# Patient Record
Sex: MALE | Race: WHITE
[De-identification: names, ages, dates, MRNs, and addresses within clinical notes are randomized per-mention and may not be internally consistent; named-entity substitution may affect disease eponyms.]

---

## 2020-02-07 ENCOUNTER — HOSPITAL ENCOUNTER (EMERGENCY)
Dept: HOSPITAL 35 - ER | Age: 60
Discharge: ANOTHER HOSPITAL | End: 2020-02-07
Payer: COMMERCIAL

## 2020-02-07 VITALS — WEIGHT: 250 LBS | BODY MASS INDEX: 35 KG/M2 | HEIGHT: 71 IN

## 2020-02-07 VITALS — SYSTOLIC BLOOD PRESSURE: 129 MMHG | DIASTOLIC BLOOD PRESSURE: 57 MMHG

## 2020-02-07 DIAGNOSIS — J18.9: ICD-10-CM

## 2020-02-07 DIAGNOSIS — E10.22: ICD-10-CM

## 2020-02-07 DIAGNOSIS — J96.91: Primary | ICD-10-CM

## 2020-02-07 DIAGNOSIS — J11.1: ICD-10-CM

## 2020-02-07 DIAGNOSIS — I12.9: ICD-10-CM

## 2020-02-07 DIAGNOSIS — Z90.89: ICD-10-CM

## 2020-02-07 DIAGNOSIS — Z85.6: ICD-10-CM

## 2020-02-07 DIAGNOSIS — R79.89: ICD-10-CM

## 2020-02-07 DIAGNOSIS — N18.9: ICD-10-CM

## 2020-02-07 DIAGNOSIS — Z88.6: ICD-10-CM

## 2020-02-07 DIAGNOSIS — Z94.81: ICD-10-CM

## 2020-02-07 DIAGNOSIS — F41.9: ICD-10-CM

## 2020-02-07 DIAGNOSIS — Z87.891: ICD-10-CM

## 2020-02-07 LAB
ALBUMIN SERPL-MCNC: 3.9 G/DL (ref 3.4–5)
ALT SERPL-CCNC: 55 U/L (ref 30–65)
ANION GAP SERPL CALC-SCNC: 11 MMOL/L (ref 7–16)
APTT BLD: 28.7 SECONDS (ref 24.5–32.8)
AST SERPL-CCNC: 63 U/L (ref 15–37)
BASOPHILS NFR BLD AUTO: 0.1 % (ref 0–2)
BE(VIVO): -3.2 MMOL/L
BILIRUB SERPL-MCNC: 0.6 MG/DL
BUN SERPL-MCNC: 27 MG/DL (ref 7–18)
CALCIUM SERPL-MCNC: 9 MG/DL (ref 8.5–10.1)
CHLORIDE SERPL-SCNC: 99 MMOL/L (ref 98–107)
CO2 SERPL-SCNC: 24 MMOL/L (ref 21–32)
CREAT SERPL-MCNC: 1.6 MG/DL (ref 0.7–1.3)
EOSINOPHIL NFR BLD: 0 % (ref 0–3)
ERYTHROCYTE [DISTWIDTH] IN BLOOD BY AUTOMATED COUNT: 14.1 % (ref 10.5–14.5)
GLUCOSE SERPL-MCNC: 209 MG/DL (ref 74–106)
GRANULOCYTES NFR BLD MANUAL: 90.8 % (ref 36–66)
HCO3 BLD-SCNC: 20.4 MMOL/L (ref 22–26)
HCT VFR BLD CALC: 43.7 % (ref 42–52)
HGB BLD-MCNC: 14.7 GM/DL (ref 14–18)
INR PPP: 1.1
LYMPHOCYTES NFR BLD AUTO: 3.1 % (ref 24–44)
MAGNESIUM SERPL-MCNC: 1.8 MG/DL (ref 1.8–2.4)
MCH RBC QN AUTO: 31.4 PG (ref 26–34)
MCHC RBC AUTO-ENTMCNC: 33.5 G/DL (ref 28–37)
MCV RBC: 93.7 FL (ref 80–100)
MONOCYTES NFR BLD: 6 % (ref 1–8)
NEUTROPHILS # BLD: 10.4 THOU/UL (ref 1.4–8.2)
PCO2 BLD: 32.5 MMHG (ref 35–45)
PLATELET # BLD: 203 THOU/UL (ref 150–400)
PO2 BLD: 59.3 MMHG (ref 80–100)
POTASSIUM SERPL-SCNC: 4.7 MMOL/L (ref 3.5–5.1)
PROT SERPL-MCNC: 7.9 G/DL (ref 6.4–8.2)
PROTHROMBIN TIME: 10.8 SECONDS (ref 9.3–11.4)
RBC # BLD AUTO: 4.67 MIL/UL (ref 4.5–6)
SODIUM SERPL-SCNC: 134 MMOL/L (ref 136–145)
TROPONIN I SERPL-MCNC: 4.88 NG/ML (ref ?–0.06)
WBC # BLD AUTO: 11.4 THOU/UL (ref 4–11)

## 2020-02-07 NOTE — HC
[facility]
[address]
[place]   [address]                     CONSULTATION                  
_______________________________________________________________________________
 
Name:       BRONWYN PIKE                    [facility] #:                     [facility]      [id number]                       Account #:      [id number]  
Admission:  02/07/20    Attend Phys:                          
Discharge:  02/07/20    Date of Birth:  [birth year]  
                                                          Report #: [id number]
                                                                    [id number]   
_______________________________________________________________________________
THIS REPORT FOR:  
 
cc:  Bret Vivar MD, Rene P. MD                                                   
                                                                  ~
CC: Ananth Vivar
 
DATE OF SERVICE:  02/07/2020
 
 
CARDIOLOGY CONSULT
 
HISTORY OF PRESENT ILLNESS:  The patient is a 59-year-old male, who comes in
with a 2-day history of some progressive dyspnea and shortness of breath.  He
was normally seen over in Dr. Vivar and Dr. Kristen Gamboa's office, redirected to
the Emergency Room.  He had been working last night and came home in the early
morning hours.  It was the night before, having progressive body aches,
weakness, some intermittent chest pain, back pain, dizziness, lightheadedness,
also some nausea with some dry heaves.  EKG has some nonspecific changes and the
troponin is 5.  He is significantly hypoxemic and has tested positive for
influenza A.  He is a marginal historian, but has had 3 stents placed, the last
of which was maybe 10 years ago.  He is followed by Lindsay [facility] by
Cardiology there.  He does not remember his last stress test.  He says he may
have a little old damage done.
 
MEDICATIONS:  His current medications are Cymbalta, Norvasc 10, atorvastatin 80,
baby aspirin, Flexeril, Desyrel, Lantus, NovoLog, ibuprofen, Xanax, and losartan
50.
 
PAST MEDICAL HISTORY:  Positive for coronary artery disease with 3 stents
placed, hypertension, hypercholesterolemia, anxiety, history of leukemia with
bone marrow transplant, diabetes, and parathyroidectomy.
 
SOCIAL HISTORY:  He works.  He is .  Prior tobacco use, no current
alcohol or tobacco.
 
REVIEW OF SYSTEMS:  Essentially negative.  Not really able to be obtained.  Some
nocturia.
 
LABORATORY DATA:  Initial blood gas with pH 7.41, pCO2 of 32, pO2 of 59.3. 
Creatinine 1.6, potassium 4.7, sodium 134.  Troponin 4.88.  White count 11.4,
hemoglobin 14.7, hematocrit 43.7.
 
Chest x-ray:  Cardiomegaly, diffuse, looks like heart failure, pulmonary edema
and some infiltrative process in addition.
 
 
 
[facility]
[address]
[place]   [address]                     CONSULTATION                  
_______________________________________________________________________________
 
Name:       BRONWYN PIKE                    Room #:                     DEP PRITI OG#:      [id number]                       Account #:      [id number]  
Admission:  02/07/20    Attend Phys:                          
Discharge:  02/07/20    Date of Birth:  [birth year]  
                                                          Report #: [id number]
                                                                    [id number]   
_______________________________________________________________________________
 
The echo Doppler preliminary report, there is some subtle anterior apical
hypokinesis, EF is about 50%, moderate aortic valve stenosis 0.9-1.0 cm2 and
mild-to-moderate MR and TR, PA pressures in the 50s.
 
PHYSICAL EXAMINATION:
VITAL SIGNS:  Pulse is 90, blood pressure 112/60.  He has a BiPAP on a
nonrebreather.
HEENT:  Eyes reveal xanthelasmas.  Pharynx is clear.
NECK:  Shows preserved upstrokes.
LUNGS:  Diffuse wheezes and coarse sounds throughout.  Diminished excursion.
CARDIOVASCULAR:  Distant heart tones, S1, S2.  There is a systolic murmur at the
upper right sternal border.
ABDOMEN:  Showing some retraction, but is soft.  There are bowel sounds.
EXTREMITIES:  Reveal trace of edema.
SKIN:  Warm and dry.  There are some venous stasis changes noted.
MUSCULOSKELETAL:  Generalized arthritic changes.
NEUROLOGIC:  Intact.
 
ASSESSMENT:
1.  Non-ST elevation myocardial infarction, but with elevated troponin of 4.88,
I suspect this is a mismatch, driven by hypoxemia and not catarino ischemia.  No
ischemic changes noted on EKG.
2.  Mild ischemic cardiomyopathy.  It looks like an old anterior apical infarct
or hypokinetic segment with 3 previous coronary stents placed, I do not have
those records.
3.  Hypoxemia with influenza A diagnosed today.
4.  Diabetes.
5.  Hypertension.
6.  Hypercholesterolemia.
 
RECOMMENDATIONS AND PLAN:  Apparently, there has already been the decision to
transfer to [facility] to Pulmonary Service and Critical Care.  To my
understanding, they request intubation.  His cardiac status is actually
relatively stable.  This seems to be driven by his infectious and probably
influenza issue underlying with the pulmonary infiltrate and hypoxemia.  He is
denying any current chest pain.  If he ends up not being transferred, we will
definitely follow with you and the echo report will be placed on the cloud.  I
should also note, his BNP was 4428, but there is minimal LV dysfunction as I
stated above.
 
Thank you for allowing us to see him.
 
 
                         
   By:                               
                   
D: 02/07/20 1155                           _____________________________________
T: 02/07/20 2037                                                           /nt

## 2020-02-07 NOTE — 2DMMODE
[facility]
[address]
[place]   [address]                   2 D/M-MODE ECHOCARDIOGRAM     
_______________________________________________________________________________
 
Name:       BRONWYN PIKE                    Room #:                     REG   
M.R.#:      [id number]                       Account #:      [id number]  
Admission:  20    Attend Phys:                          
Discharge:              Date of Birth:  [birth year]  
                                                          Report #: [id number]
                                                                    [id number]
_______________________________________________________________________________
THIS REPORT FOR:  
 
cc:  Bret Vivar MD, Rene P. MD Lundgren,Ezequiel COLON MD [facility]                                        
                                                                       ~
 
--------------- APPROVED REPORT --------------
 
 
Study performed:  2020 11:13:28
 
EXAM: Comprehensive 2D, Doppler, and color-flow 
Echocardiogram 
Patient Location: ER    
Room #:  9     Status:  stat
 
      BSA:         2.32
HR: 89 bpm BP:          129/78 mmHg 
Rhythm: NSR     
 
Other Information 
Study Quality: Good
 
Indications
Dyspnea 
CAD
 
2D Dimensions
RVDd:  39.08 mm  
IVSd:  15.20 (7-11mm) LVOT Diam:  20.87 (18-24mm) 
LVDd:  48.88 mm  
PWd:  12.59 (7-11mm) Ascending Ao:  31.57 (22-36mm)
LVDs:  32.83 (25-40mm) 
Aortic Root:  34.04 mm IVC:  31.00 mm
 
Volumes
Left Atrial Volume (Systole) 
Single Plane 4CH:  86.94 mL Single Plane 2CH:  81.14 mL
    LA ESV Index:  42.00 mL/m2
 
Aortic Valve
AoV Peak Mikal.:  3.82 m/s 
AO Peak Gr.:  58.33 mmHg LVOT Max PG:  3.98 mmHg
AO Mean Gr.:  39.29 mmHg LVOT Mean P.45 mmHg
AO V2 Mean:  2.94 m/s  LVOT Max V:  1.00 m/s
 
 
[facility]
[address]
[place]  [address]
Phone:  [phone]                    2 D/M-MODE ECHOCARDIOGRAM     
_______________________________________________________________________________
 
Name:            GLENDYHAMLETY                    Room #:                    REG PRITI OG#:           [id number]          Account #:     [id number]  
Admission:       20         Attend Phys:                     
Discharge:                  Date of Birth: [birth year]  
                         Report #:      [id number]
        [id number]
_______________________________________________________________________________
AO V2 VTI:  74.89 cm  LVOT Mean V:  0.73 m/s
BESSIE (VTI):  1.06 cm2  LVOT V1 VTI:  23.20 cm
BESSIE Vmax: 0.89 cm2  
AI Vmax:  3.66 m/s  SV (LVOT):  79.32 mL
AI Salem:  3.95 m/s2  
AI PHT:  269.93 ms  
 
Mitral Valve
    E/A Ratio:  1.4
    MV Decel. Time:  139.09 ms
MV E Max Mikal.:  1.18 m/s 
MV A Mikal.:  0.82 m/s  
MV PHT:  40.33 ms  
IVRT:  73.82 ms   
 
Pulmonary Valve
PV Peak Mikal.:  1.03 m/s PV Peak Gr.:  4.25 mmHg
 
Pulmonary Vein
P Vein S:    0.34 m/s P Vein A:  0.23 m/s
P Vein D:   0.55 m/s P Vein A Dur.:  92.3 msec
P Vein S/D Ratio:  0.62 
 
Tricuspid Valve
TR Peak Mikal.:  3.45 m/s  
TR Peak Gr.:  47.66 mmHg 
    PA Pressure:  58.00 mmHg
 
Left Ventricle
The left ventricle is normal size. Regional wall motion abnormalities 
cannot be excluded. Mild concentric left ventricular hypertrophy. The 
overall left ventricular systolic function appears at the lower 
limits of normal. LVEF is 50%. Moderate diastolic dysfunction is 
present (pseudonormal filling).
 
Right Ventricle
The right ventricle is normal size. The right ventricular systolic 
function is normal.
 
Atria
Left atrium is dilated. Right atrium is dilated.
 
Aortic Valve
Aortic valve is heavily calcified. Mild aortic regurgitation. Severe 
aortic stenosis. Calculated aortic valve area is 0.9cm2 with maximum 
pressure gradient of 58 mmHg and mean pressure gradient of 37 
 
 
[facility]
[address]
[place]  [address]
Phone:  [phone] 2 D/M-MODE ECHOCARDIOGRAM     
_______________________________________________________________________________
 
Name:            BRONWYN PIKE                    Room #:                    REG PRITI OG#:           [id number]          Account #:     [id number]  
Admission:       20         Attend Phys:                     
Discharge:                  Date of Birth: [birth year]  
                         Report #:      [id number]
        [id number]
_______________________________________________________________________________
mmHg.
 
Mitral Valve
Mitral valve leaflets are thickened. Moderate mitral annular 
calcification. Moderate mitral regurgitation. No evidence of mitral 
valve stenosis.
 
Tricuspid Valve
The tricuspid valve is normal in structure. There is moderate to 
severe tricuspid regurgitation. Estimated PAP 58 mmHg. There is 
moderate pulmonary hypertension.
 
Pulmonic Valve
The pulmonary valve is normal in structure. Mild pulmonic 
regurgitation.
 
Great Vessels
The aortic root is normal in size. IVC is dilated and collapses 
<50% with inspiration.
 
Pericardium
There is no pericardial effusion.
 
<Conclusion>
Technically limited study
The overall left ventricular systolic function appears at the lower 
limits of normal.
LVEF is 50%.
Moderate diastolic dysfunction is present (pseudonormal 
filling).
Both atria are dilated.
Aortic valve is heavily calcified. Severe aortic stenosis, mild 
insuffiency.
Calculated aortic valve area is  0.9cm2 with maximum pressure 
gradient of 58 mmHg and mean pressure gradient of 37 mmHg.
Mitral valve leaflets are thickened. Moderate mitral annular 
calcification. Moderate mitral regurgitation.
There is moderate to severe tricuspid regurgitation. Estimated 
pulmonary artery pressure of 58 mmHg.
There is no pericardial effusion.
 
 
 
 
  <ELECTRONICALLY SIGNED>
   By: Ezequiel Haley MD, FACC   
  20     1311
D: 20 1311                           _____________________________________
T: 20 131                           Ezequiel Haley MD, FACC     /INF

## 2020-02-07 NOTE — EKG
[facility]
[address]
[place]   [address]                     ELECTROCARDIOGRAM REPORT      
_______________________________________________________________________________
 
Name:       BRONWYN PIKE                    Room #:                     PRE   
M.R.#:      [id number]                       Account #:      [id number]  
Admission:              Attend Phys:                          
Discharge:              Date of Birth:  [birth year]  
                                                          Report #: [id number]
                                                                    [id number]
_______________________________________________________________________________
THIS REPORT FOR:  
 
cc:  Bret Vivar MD, Rene P. MD Epiphany, Epiphany MD                                             ~
THIS REPORT FOR:   //name//                          
 
                         [facility] ED
                                       
Test Date:    2020               Test Time:    09:48:50
Pat Name:     BRONWYN PIKE               Department:   
Patient ID:   [id number]            Room:          
Gender:       M                        Technician:   pasquale
:          1960               Requested By: Ananth Joshi
Order Number: [id number] MD:     
                                 Measurements
Intervals                              Axis          
Rate:         90                       P:            51
NJ:           183                      QRS:          -21
QRSD:         102                      T:            65
QT:           354                                    
QTc:          433                                    
                           Interpretive Statements
Sinus rhythm
Probable left atrial enlargement
Borderline left axis deviation
Anteroseptal infarct, old
Minimal ST elevation, lateral leads
Baseline wander in lead(s) V4
Compared to ECG 2013 01:18:57
Myocardial infarct finding now present
ST (T wave) deviation now present
[email or web address]
 
 
 
 
 
 
 
 
 
 
 
                         
   By:                               
                   
D: 20 0948                           _____________________________________
T: 20 0948                           Epiphany Epiphany, MD           /EPI

## 2020-02-12 NOTE — EKG
[facility]
[place]   [address]                     ELECTROCARDIOGRAM REPORT      
_______________________________________________________________________________
 
Name:       BRONWYN PIKE                    Room #:                     DEP   
M.R.#:      [id number]                       Account #:      [id number]  
Admission:  20    Attend Phys:                          
Discharge:  20    Date of Birth:  [birth year]  
                                                          Report #: [id number]
                                                                    [id number]
_______________________________________________________________________________
THIS REPORT FOR:  
 
cc:  Bret Vivar MD, Rene P. MD Lundgren,Ezequiel COLON MD [facility]                                        ~
THIS REPORT FOR:   //name//                          
 
                         [facility] ED
                                       
Test Date:    2020               Test Time:    10:31:22
Pat Name:     BRONWYN PIKE               Department:   
Patient ID:   [id number]            Room:          
Gender:                               Technician:   BRADLEY
:          1960               Requested By: Ananth Joshi
Order Number: [id number] MD:   Ezequiel Haley
                                 Measurements
Intervals                              Axis          
Rate:         92                       P:            49
VT:           171                      QRS:          -26
QRSD:         100                      T:            61
QT:           369                                    
QTc:          457                                    
                           Interpretive Statements
Sinus rhythm
Borderline left axis deviation
Poor R wave progression
Compared to ECG 2020 09:48:50
No significant change was found
Electronically Signed On 2020 17:22:59 CST by Ezequiel Haley
[email or web address]
 
 
 
 
 
 
 
 
 
 
 
 
 
 
  <ELECTRONICALLY SIGNED>
   By: Ezequiel Haley MD, [facility]   
  20     1722
D: 20 1031                           _____________________________________
T: 20 1031                           Ezequiel Haley MD, [facility]     /EPI

## 2020-04-24 ENCOUNTER — HOSPITAL ENCOUNTER (OUTPATIENT)
Dept: HOSPITAL 35 - RAD | Age: 60
End: 2020-04-24
Attending: FAMILY MEDICINE
Payer: COMMERCIAL

## 2020-04-24 DIAGNOSIS — J90: Primary | ICD-10-CM

## 2020-04-24 DIAGNOSIS — I51.7: ICD-10-CM

## 2020-04-24 DIAGNOSIS — I50.9: ICD-10-CM

## 2021-01-26 ENCOUNTER — HOSPITAL ENCOUNTER (OUTPATIENT)
Dept: HOSPITAL 35 - LAB | Age: 61
End: 2021-01-26
Attending: FAMILY MEDICINE
Payer: COMMERCIAL

## 2021-01-26 DIAGNOSIS — Z20.822: ICD-10-CM

## 2021-01-26 DIAGNOSIS — R05: Primary | ICD-10-CM
